# Patient Record
Sex: FEMALE | Race: WHITE | NOT HISPANIC OR LATINO | URBAN - METROPOLITAN AREA
[De-identification: names, ages, dates, MRNs, and addresses within clinical notes are randomized per-mention and may not be internally consistent; named-entity substitution may affect disease eponyms.]

---

## 2018-06-22 ENCOUNTER — OFFICE VISIT (OUTPATIENT)
Dept: URBAN - METROPOLITAN AREA CLINIC 45 | Facility: CLINIC | Age: 68
End: 2018-06-22
Payer: MEDICARE

## 2018-06-22 DIAGNOSIS — H25.13 AGE-RELATED NUCLEAR CATARACT, BILATERAL: Primary | ICD-10-CM

## 2018-06-22 DIAGNOSIS — H43.813 VITREOUS DEGENERATION, BILATERAL: ICD-10-CM

## 2018-06-22 DIAGNOSIS — E11.9 TYPE 2 DIABETES MELLITUS WITHOUT COMPLICATIONS: ICD-10-CM

## 2018-06-22 DIAGNOSIS — H52.4 PRESBYOPIA: ICD-10-CM

## 2018-06-22 PROCEDURE — 92014 COMPRE OPH EXAM EST PT 1/>: CPT | Performed by: OPTOMETRIST

## 2018-06-22 RX ORDER — AMLODIPINE BESYLATE 10 MG/1
10 MG TABLET ORAL
Qty: 0 | Refills: 0 | Status: ACTIVE
Start: 2018-06-22

## 2018-06-22 ASSESSMENT — INTRAOCULAR PRESSURE
OS: 12
OD: 12

## 2018-06-22 NOTE — IMPRESSION/PLAN
Impression: Type 2 diabetes mellitus without complications: P89.7. Plan: Diabetes type II: no background retinopathy, no signs of neovascularization noted. Discussed ocular and systemic benefits of blood sugar control.

## 2018-06-22 NOTE — IMPRESSION/PLAN
Impression: Age-related nuclear cataract, bilateral: H25.13. Plan: Reassured patient of current condition and treatment. Will continue to observe condition and or symptoms. Advised pt not to drive.

## 2018-11-19 ENCOUNTER — OFFICE VISIT (OUTPATIENT)
Dept: URBAN - METROPOLITAN AREA CLINIC 45 | Facility: CLINIC | Age: 68
End: 2018-11-19
Payer: MEDICARE

## 2018-11-19 DIAGNOSIS — B02.32 ZOSTER IRIDOCYCLITIS: Primary | ICD-10-CM

## 2018-11-19 DIAGNOSIS — H25.813 COMBINED FORMS OF AGE-RELATED CATARACT, BILATERAL: ICD-10-CM

## 2018-11-19 DIAGNOSIS — H04.123 DRY EYE SYNDROME OF BILATERAL LACRIMAL GLANDS: ICD-10-CM

## 2018-11-19 PROCEDURE — 92012 INTRM OPH EXAM EST PATIENT: CPT | Performed by: OPTOMETRIST

## 2018-11-19 RX ORDER — PREDNISOLONE ACETATE 10 MG/ML
1 % SUSPENSION/ DROPS OPHTHALMIC
Qty: 1 | Refills: 0 | Status: INACTIVE
Start: 2018-11-19 | End: 2018-12-18

## 2018-11-19 ASSESSMENT — INTRAOCULAR PRESSURE
OD: 12
OS: 12

## 2018-11-19 NOTE — IMPRESSION/PLAN
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 Plan: pt to start pred-acetate 1 gtt qid ou. Educated patient on risks of non-compliance, side effects, benefits and anticipated results, pt understands.

## 2018-11-27 ENCOUNTER — OFFICE VISIT (OUTPATIENT)
Dept: URBAN - METROPOLITAN AREA CLINIC 45 | Facility: CLINIC | Age: 68
End: 2018-11-27
Payer: MEDICARE

## 2018-11-27 PROCEDURE — 99213 OFFICE O/P EST LOW 20 MIN: CPT | Performed by: OPTOMETRIST

## 2018-11-27 ASSESSMENT — INTRAOCULAR PRESSURE
OS: 14
OD: 13

## 2018-11-27 NOTE — IMPRESSION/PLAN
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 Plan: pt to taper pred-acetate 1 gtt tid x 3 days bid x 3 day then q day x 3 days then . dc